# Patient Record
Sex: MALE | URBAN - METROPOLITAN AREA
[De-identification: names, ages, dates, MRNs, and addresses within clinical notes are randomized per-mention and may not be internally consistent; named-entity substitution may affect disease eponyms.]

---
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
## 2019-05-20 ENCOUNTER — TRANSFERRED RECORDS (OUTPATIENT)
Dept: HEALTH INFORMATION MANAGEMENT | Facility: CLINIC | Age: 19
End: 2019-05-20

## 2019-06-03 NOTE — TELEPHONE ENCOUNTER
RECORDS RECEIVED FROM: Per Pt, [employer], being referred by Endocrinologist   DATE RECEIVED: 06.04.2019   NOTES STATUS DETAILS   OFFICE NOTE from referring provider Received 06.04.2019 [facility]   OFFICE NOTES from other specialists N/A    DISCHARGE SUMMARY from hospital N/A    MEDICATION LIST N/A    LIVER BIOSPY (IF APPLICABLE)      PATHOLOGY REPORTS  N/A    IMAGING     ENDOSCOPY (IF AVAILABLE) N/A    COLONOSCOPY (IF AVAILABLE) N/A    ULTRASOUND LIVER N/A    CT OF ABDOMEN N/A    MRI OF LIVER N/A    FIBROSCAN, US ELASTOGRAPHY, FIBROSIS SCAN, MR ELASTOGRAPHY N/A    LABS     HEPATIC PANEL (LIVER PANEL) N/A    BASIC METABOLIC PANEL N/A    COMPLETE METABOLIC PANEL N/A    COMPLETE BLOOD COUNT (CBC) N/A    INTERNATIONAL NORMALIZED RATIO (INR) N/A    HEPATITIS C ANTIBODY N/A    HEPATITIS C VIRAL LOAD/PCR N/A    HEPATITIS C GENOTYPE N/A    HEPATITIS B SURFACE ANTIGEN N/A    HEPATITIS B SURFACE ANTIBODY N/A    HEPATITIS B DNA QUANT LEVEL N/A    HEPATITIS B CORE ANTIBODY N/A      06.03.2019 Called and spoke with pt. He states that he is self referred, he states he was seen at [facility] in [place] and he see's any Endocrin doctor. I called [facility] at  [phone] and called [phone] spoke to a rep states she will fax over last office visit.    06.04.2019 Records received from [facility].

## 2019-06-04 ENCOUNTER — PRE VISIT (OUTPATIENT)
Dept: GASTROENTEROLOGY | Facility: CLINIC | Age: 19
End: 2019-06-04

## 2019-06-04 ENCOUNTER — OFFICE VISIT (OUTPATIENT)
Dept: GASTROENTEROLOGY | Facility: CLINIC | Age: 19
End: 2019-06-04
Attending: INTERNAL MEDICINE
Payer: COMMERCIAL

## 2019-06-04 VITALS
SYSTOLIC BLOOD PRESSURE: 153 MMHG | RESPIRATION RATE: 14 BRPM | DIASTOLIC BLOOD PRESSURE: 112 MMHG | WEIGHT: 172.6 LBS | TEMPERATURE: 98 F | HEART RATE: 79 BPM | OXYGEN SATURATION: 99 % | HEIGHT: 72 IN | BODY MASS INDEX: 23.38 KG/M2

## 2019-06-04 DIAGNOSIS — R94.5 ABNORMAL RESULTS OF LIVER FUNCTION STUDIES: ICD-10-CM

## 2019-06-04 DIAGNOSIS — R11.2 NON-INTRACTABLE VOMITING WITH NAUSEA, UNSPECIFIED VOMITING TYPE: ICD-10-CM

## 2019-06-04 DIAGNOSIS — I10 BENIGN ESSENTIAL HYPERTENSION: ICD-10-CM

## 2019-06-04 DIAGNOSIS — R94.5 ABNORMAL RESULTS OF LIVER FUNCTION STUDIES: Primary | ICD-10-CM

## 2019-06-04 DIAGNOSIS — E10.9 TYPE 1 DIABETES MELLITUS WITHOUT COMPLICATION (H): ICD-10-CM

## 2019-06-04 LAB
ALBUMIN SERPL-MCNC: 3.8 G/DL (ref 3.4–5)
ALP SERPL-CCNC: 105 U/L (ref 40–150)
ALT SERPL W P-5'-P-CCNC: 28 U/L (ref 0–70)
ANION GAP SERPL CALCULATED.3IONS-SCNC: 4 MMOL/L (ref 3–14)
AST SERPL W P-5'-P-CCNC: 15 U/L (ref 0–45)
BILIRUB DIRECT SERPL-MCNC: 0.1 MG/DL (ref 0–0.2)
BILIRUB SERPL-MCNC: 0.4 MG/DL (ref 0.2–1.3)
BUN SERPL-MCNC: 12 MG/DL (ref 7–30)
CALCIUM SERPL-MCNC: 9.2 MG/DL (ref 8.5–10.1)
CHLORIDE SERPL-SCNC: 109 MMOL/L (ref 94–109)
CO2 SERPL-SCNC: 28 MMOL/L (ref 20–32)
CREAT SERPL-MCNC: 0.92 MG/DL (ref 0.66–1.25)
ERYTHROCYTE [DISTWIDTH] IN BLOOD BY AUTOMATED COUNT: 13.1 % (ref 10–15)
FERRITIN SERPL-MCNC: 72 NG/ML (ref 26–388)
GFR SERPL CREATININE-BSD FRML MDRD: >90 ML/MIN/{1.73_M2}
GLUCOSE SERPL-MCNC: 82 MG/DL (ref 70–99)
HCT VFR BLD AUTO: 47.3 % (ref 40–53)
HGB BLD-MCNC: 14.8 G/DL (ref 13.3–17.7)
IGG SERPL-MCNC: 1360 MG/DL (ref 695–1620)
IGM SERPL-MCNC: 173 MG/DL (ref 60–265)
IRON SATN MFR SERPL: 30 % (ref 15–46)
IRON SERPL-MCNC: 88 UG/DL (ref 35–180)
MCH RBC QN AUTO: 26.4 PG (ref 26.5–33)
MCHC RBC AUTO-ENTMCNC: 31.3 G/DL (ref 31.5–36.5)
MCV RBC AUTO: 85 FL (ref 78–100)
PLATELET # BLD AUTO: 199 10E9/L (ref 150–450)
POTASSIUM SERPL-SCNC: 3.7 MMOL/L (ref 3.4–5.3)
PROT SERPL-MCNC: 7.6 G/DL (ref 6.8–8.8)
RBC # BLD AUTO: 5.6 10E12/L (ref 4.4–5.9)
SODIUM SERPL-SCNC: 142 MMOL/L (ref 133–144)
TIBC SERPL-MCNC: 293 UG/DL (ref 240–430)
WBC # BLD AUTO: 6.2 10E9/L (ref 4–11)

## 2019-06-04 PROCEDURE — 82784 ASSAY IGA/IGD/IGG/IGM EACH: CPT | Performed by: INTERNAL MEDICINE

## 2019-06-04 PROCEDURE — 36415 COLL VENOUS BLD VENIPUNCTURE: CPT | Performed by: INTERNAL MEDICINE

## 2019-06-04 PROCEDURE — G0463 HOSPITAL OUTPT CLINIC VISIT: HCPCS | Mod: ZF

## 2019-06-04 PROCEDURE — 83516 IMMUNOASSAY NONANTIBODY: CPT | Performed by: INTERNAL MEDICINE

## 2019-06-04 PROCEDURE — 83550 IRON BINDING TEST: CPT | Performed by: INTERNAL MEDICINE

## 2019-06-04 PROCEDURE — 82728 ASSAY OF FERRITIN: CPT | Performed by: INTERNAL MEDICINE

## 2019-06-04 PROCEDURE — 83540 ASSAY OF IRON: CPT | Performed by: INTERNAL MEDICINE

## 2019-06-04 PROCEDURE — 86038 ANTINUCLEAR ANTIBODIES: CPT | Performed by: INTERNAL MEDICINE

## 2019-06-04 PROCEDURE — 80076 HEPATIC FUNCTION PANEL: CPT | Performed by: INTERNAL MEDICINE

## 2019-06-04 PROCEDURE — 85027 COMPLETE CBC AUTOMATED: CPT | Performed by: INTERNAL MEDICINE

## 2019-06-04 PROCEDURE — 80048 BASIC METABOLIC PNL TOTAL CA: CPT | Performed by: INTERNAL MEDICINE

## 2019-06-04 RX ORDER — INSULIN DEGLUDEC 100 U/ML
23 INJECTION, SOLUTION SUBCUTANEOUS DAILY
Refills: 11 | COMMUNITY
Start: 2018-06-13

## 2019-06-04 RX ORDER — INSULIN ASPART 100 [IU]/ML
INJECTION, SOLUTION INTRAVENOUS; SUBCUTANEOUS
Refills: 11 | COMMUNITY
Start: 2019-05-21

## 2019-06-04 ASSESSMENT — PAIN SCALES - GENERAL: PAINLEVEL: NO PAIN (0)

## 2019-06-04 ASSESSMENT — MIFFLIN-ST. JEOR: SCORE: 1830.91

## 2019-06-04 NOTE — LETTER
"6/4/2019      RE: Zan Ritchie  [id number] [facility] [id number]       Date of Service: 6/4/2019     Referring Provider: self    Subjective:            Zan Ritchie is a 19 year old male presenting for evaluation of abnormal liver tests    History of Present Illness   Zan Ritchie is a 19 year old male with past medical history of diabetes mellitus type 1 on insulin therapy who presents in consultation with concerns of abnormal liver tests.  He presents today's clinic visit with his mother.    He was initially diagnosed with diabetes in 2012, at that time he had significant ketonuria and and hemoglobin A1c of greater than 14%.,  At the time of his diagnosis he was noted to have a significantly elevated alkaline phosphatase of approximately 350, however, this has improved over time he was able to get good and aggressive control of his diabetes, and presents to clinic today with his most recent labs demonstrating an A1c of 6.3%.  He is not really had any other medical issues throughout his life.  He notes that over the last several months he has had issues with an upset stomach that ultimately led to nausea and intermittent emesis.  He was given a diagnosis of gastritis, but did not undergo an endoscopy to formally evaluate.  He was given a prescription for Zofran, and believes that this did help his symptoms.  He notes as recently as this week he had an episode of nausea with associated emesis, but felt well after vomiting.     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  He admits that he really enjoys school and is doing well and is on a significant academic scholarship.  " He notes that he remains physically active at school and works out several times a week on average, however does not participate typically in heavy weightlifting.  He admits to drinking 1-2 nights per week upwards of 5 alcoholic beverages per session.  Denies IV or inhaled drugs of abuse, and denies use of any cannabis-containing products.  In regard to his family history is noted that his father has hypertension and type 2 diabetes and that his mother's otherwise healthy.  There is a remote family history of sickle cell disease.    Past Medical History:  Diabetes Mellitus, Type I insulin dependent    Surgical History:  No past surgical history on file.    Social History:  Social History     Tobacco Use     Smoking status: Never Smoker     Smokeless tobacco: Never Used   Substance Use Topics     Alcohol use: Yes     Comment: special occasions     Drug use: Never        Family History:  father has hypertension and type 2 diabetes and that his mother's otherwise healthy.  There is a remote family history of sickle cell disease.  - No family history of liver disease    Medications:  Current Outpatient Medications   Medication     Multiple Vitamins-Minerals (MULTIVITAMIN PO)     NOVOLOG FLEXPEN 100 UNIT/ML soln     TRESIBA FLEXTOUCH 100 UNIT/ML pen     No current facility-administered medications for this visit.        Review of Systems  A complete 10 point review of systems was asked and answered in the negative unless specifically commented upon in the HPI    Objective:         Vitals:    06/04/19 1020   BP: (!) 153/112   BP Location: Left arm   Patient Position: Sitting   Cuff Size: Adult Regular   Pulse: 79   Resp: 14   Temp: 98  F (36.7  C)   TempSrc: Oral   SpO2: 99%   Weight: 78.3 kg (172 lb 9.6 oz)   Height: 1.829 m (6')     Body mass index is 23.41 kg/m .     Physical Exam    Vitals reviewed.   Constitutional: Well-developed, well-nourished, in no apparent distress.    HEENT: Normocephalic. no scleral icterus.  Moist oral mucosa. Dentition normal  Neck/Lymph: Normal ROM, supple. No thyromegaly.  No lymphadenopathy  Cardiac:  Regular rate and rhythm.  No overt murmurs  Respiratory: Clear to auscultation bilaterally.  No wheezes or rales  GI:  Abdomen soft, non-distended, non-tender. BS present. no shifting dullness. No overt hepatosplenomegaly.     Skin:  Skin is warm and dry. No rash noted.  no jaundice. no spider nevi noted.  no palmar erythema  Peripheral Vascular: no lower extremity edema. 2+ pulses in all extremities  Musculoskeletal:  ROM intact, no muscle bulk    Psychiatric: Normal mood and affect. Behavior is normal.  Neuro:  no asterixis, no tremor    Labs and Diagnostic tests:  Results for ALFREDO ROBERTSON (MRN [id number]) as of 6/4/2019 12:30   Ref. Range 6/4/2019 11:43 6/4/2019 11:44   Sodium Latest Ref Range: 133 - 144 mmol/L  142   Potassium Latest Ref Range: 3.4 - 5.3 mmol/L  3.7   Chloride Latest Ref Range: 94 - 109 mmol/L  109   Carbon Dioxide Latest Ref Range: 20 - 32 mmol/L  28   Urea Nitrogen Latest Ref Range: 7 - 30 mg/dL  12   Creatinine Latest Ref Range: 0.66 - 1.25 mg/dL  0.92   GFR Estimate Latest Ref Range: >60 mL/min/1.73_m2  >90   GFR Estimate If Black Latest Ref Range: >60 mL/min/1.73_m2  >90   Calcium Latest Ref Range: 8.5 - 10.1 mg/dL  9.2   Anion Gap Latest Ref Range: 3 - 14 mmol/L  4   Albumin Latest Ref Range: 3.4 - 5.0 g/dL  3.8   Protein Total Latest Ref Range: 6.8 - 8.8 g/dL  7.6   Bilirubin Total Latest Ref Range: 0.2 - 1.3 mg/dL  0.4   Alkaline Phosphatase Latest Ref Range: 40 - 150 U/L  105   ALT Latest Ref Range: 0 - 70 U/L  28   AST Latest Ref Range: 0 - 45 U/L  15   Bilirubin Direct Latest Ref Range: 0.0 - 0.2 mg/dL  0.1   Ferritin Latest Ref Range: 26 - 388 ng/mL  72   Iron Latest Ref Range: 35 - 180 ug/dL 88    Iron Binding Cap Latest Ref Range: 240 - 430 ug/dL 293    Iron Saturation Index Latest Ref Range: 15 - 46 % 30    Glucose Latest Ref Range: 70 - 99 mg/dL  82   WBC Latest Ref  Range: 4.0 - 11.0 10e9/L  6.2   Hemoglobin Latest Ref Range: 13.3 - 17.7 g/dL  14.8   Hematocrit Latest Ref Range: 40.0 - 53.0 %  47.3   Platelet Count Latest Ref Range: 150 - 450 10e9/L  199   RBC Count Latest Ref Range: 4.4 - 5.9 10e12/L  5.60   MCV Latest Ref Range: 78 - 100 fl  85   MCH Latest Ref Range: 26.5 - 33.0 pg  26.4 (L)   MCHC Latest Ref Range: 31.5 - 36.5 g/dL  31.3 (L)   RDW Latest Ref Range: 10.0 - 15.0 %  13.1       Imaging:  None available      Assessment and Plan:    Abnormal Liver Tests:    -At this time etiology of his abnormal liver tests is unclear  -He does not really have any overt risk factors for the development of viral hepatitis and admits that he is fully vaccinated  -As he does have 1 autoimmune disease in the diabetes, he is at risk for developing others and do feel it is appropriate for him to undergo screening for autoimmune hepatitis  -We will repeat basic metabolic panel and hepatic panel today.  We will also order labs for iron overload in the setting of diabetes and autoimmune serologies  -We have ordered repeat labs in a few months to assess for changes  -We will obtain an abdominal ultrasound to evaluate for any chronic changes    Nausea and emesis:  -He carries diagnosis of gastritis, and notes that he is been taking intermittent antacids to no avail.  He does note that Zofran has helped his symptoms  -Felt appropriate for him to trial on daily H2 blocker to assess for clinical improvement  -Differential must also include the onset of gastroparesis in the setting of diabetes, although, this would be unexpected as he does have excellent control of his disease.    Hypertension:  -Discussed with patient the need to develop a long-term relationship with a primary care provider to assist in management of chronic medical conditions including diabetes and hypertension    Routine Health Care in Patient with Chronic Liver Disease:  - We recommend screening for hepatitis A and B,  please vaccinate if not immune  - All patients with liver disease, particularly those with cholestatic liver disease, are at an increased risk for osteoporosis.  We strongly recommend screening for Vitamin D deficiency at least twice yearly with aggressive supplementation/replacement as indicated.    - We also recommend a screening DEXA scan to evaluate for osteoporosis.  If present, should treat with calcium, Vitamin D supplementation, and recommend consideration of bisphosphonate therapy.  Also recommend follow up DEXA scans to evaluate for improvement of bone density on therapy.  - All patients with liver disease should avoid the use of Non-steroidal Anti-Inflammatory (NSAID) medications as they can cause significant injury to the kidneys in this population    Follow Up:  6-7 months     Thank you very much for the opportunity to participate in the care of this patient.  If you have any further questions, please don't hesitate to contact our office.    Thomas M. Leventhal, M.D.   of Medicine  Advanced & Transplant Hepatology  [facility]      Thomas M. Leventhal, MD

## 2019-06-04 NOTE — PROGRESS NOTES
"Date of Service: 6/4/2019     Referring Provider: self    Subjective:            Zan Ritchie is a 19 year old male presenting for evaluation of abnormal liver tests    History of Present Illness   Zan Ritchie is a 19 year old male with past medical history of diabetes mellitus type 1 on insulin therapy who presents in consultation with concerns of abnormal liver tests.  He presents today's clinic visit with his mother.    He was initially diagnosed with diabetes in 2012, at that time he had significant ketonuria and and hemoglobin A1c of greater than 14%.,  At the time of his diagnosis he was noted to have a significantly elevated alkaline phosphatase of approximately 350, however, this has improved over time he was able to get good and aggressive control of his diabetes, and presents to clinic today with his most recent labs demonstrating an A1c of 6.3%.  He is not really had any other medical issues throughout his life.  He notes that over the last several months he has had issues with an upset stomach that ultimately led to nausea and intermittent emesis.  He was given a diagnosis of gastritis, but did not undergo an endoscopy to formally evaluate.  He was given a prescription for Zofran, and believes that this did help his symptoms.  He notes as recently as this week he had an episode of nausea with associated emesis, but felt well after vomiting.     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  He admits that he really enjoys school and is doing well and is on a significant academic scholarship.  He notes that he remains physically active at school and works out several " times a week on average, however does not participate typically in heavy weightlifting.  He admits to drinking 1-2 nights per week upwards of 5 alcoholic beverages per session.  Denies IV or inhaled drugs of abuse, and denies use of any cannabis-containing products.  In regard to his family history is noted that his father has hypertension and type 2 diabetes and that his mother's otherwise healthy.  There is a remote family history of sickle cell disease.    Past Medical History:  Diabetes Mellitus, Type I insulin dependent    Surgical History:  No past surgical history on file.    Social History:  Social History     Tobacco Use     Smoking status: Never Smoker     Smokeless tobacco: Never Used   Substance Use Topics     Alcohol use: Yes     Comment: special occasions     Drug use: Never        Family History:  father has hypertension and type 2 diabetes and that his mother's otherwise healthy.  There is a remote family history of sickle cell disease.  - No family history of liver disease    Medications:  Current Outpatient Medications   Medication     Multiple Vitamins-Minerals (MULTIVITAMIN PO)     NOVOLOG FLEXPEN 100 UNIT/ML soln     TRESIBA FLEXTOUCH 100 UNIT/ML pen     No current facility-administered medications for this visit.        Review of Systems  A complete 10 point review of systems was asked and answered in the negative unless specifically commented upon in the HPI    Objective:         Vitals:    06/04/19 1020   BP: (!) 153/112   BP Location: Left arm   Patient Position: Sitting   Cuff Size: Adult Regular   Pulse: 79   Resp: 14   Temp: 98  F (36.7  C)   TempSrc: Oral   SpO2: 99%   Weight: 78.3 kg (172 lb 9.6 oz)   Height: 1.829 m (6')     Body mass index is 23.41 kg/m .     Physical Exam    Vitals reviewed.   Constitutional: Well-developed, well-nourished, in no apparent distress.    HEENT: Normocephalic. no scleral icterus. Moist oral mucosa. Dentition normal  Neck/Lymph: Normal ROM, supple. No  thyromegaly.  No lymphadenopathy  Cardiac:  Regular rate and rhythm.  No overt murmurs  Respiratory: Clear to auscultation bilaterally.  No wheezes or rales  GI:  Abdomen soft, non-distended, non-tender. BS present. no shifting dullness. No overt hepatosplenomegaly.     Skin:  Skin is warm and dry. No rash noted.  no jaundice. no spider nevi noted.  no palmar erythema  Peripheral Vascular: no lower extremity edema. 2+ pulses in all extremities  Musculoskeletal:  ROM intact, no muscle bulk    Psychiatric: Normal mood and affect. Behavior is normal.  Neuro:  no asterixis, no tremor    Labs and Diagnostic tests:  Results for ALFREDO ROBERTSON (MRN [id number]) as of 6/4/2019 12:30   Ref. Range 6/4/2019 11:43 6/4/2019 11:44   Sodium Latest Ref Range: 133 - 144 mmol/L  142   Potassium Latest Ref Range: 3.4 - 5.3 mmol/L  3.7   Chloride Latest Ref Range: 94 - 109 mmol/L  109   Carbon Dioxide Latest Ref Range: 20 - 32 mmol/L  28   Urea Nitrogen Latest Ref Range: 7 - 30 mg/dL  12   Creatinine Latest Ref Range: 0.66 - 1.25 mg/dL  0.92   GFR Estimate Latest Ref Range: >60 mL/min/1.73_m2  >90   GFR Estimate If Black Latest Ref Range: >60 mL/min/1.73_m2  >90   Calcium Latest Ref Range: 8.5 - 10.1 mg/dL  9.2   Anion Gap Latest Ref Range: 3 - 14 mmol/L  4   Albumin Latest Ref Range: 3.4 - 5.0 g/dL  3.8   Protein Total Latest Ref Range: 6.8 - 8.8 g/dL  7.6   Bilirubin Total Latest Ref Range: 0.2 - 1.3 mg/dL  0.4   Alkaline Phosphatase Latest Ref Range: 40 - 150 U/L  105   ALT Latest Ref Range: 0 - 70 U/L  28   AST Latest Ref Range: 0 - 45 U/L  15   Bilirubin Direct Latest Ref Range: 0.0 - 0.2 mg/dL  0.1   Ferritin Latest Ref Range: 26 - 388 ng/mL  72   Iron Latest Ref Range: 35 - 180 ug/dL 88    Iron Binding Cap Latest Ref Range: 240 - 430 ug/dL 293    Iron Saturation Index Latest Ref Range: 15 - 46 % 30    Glucose Latest Ref Range: 70 - 99 mg/dL  82   WBC Latest Ref Range: 4.0 - 11.0 10e9/L  6.2   Hemoglobin Latest Ref Range: 13.3 -  17.7 g/dL  14.8   Hematocrit Latest Ref Range: 40.0 - 53.0 %  47.3   Platelet Count Latest Ref Range: 150 - 450 10e9/L  199   RBC Count Latest Ref Range: 4.4 - 5.9 10e12/L  5.60   MCV Latest Ref Range: 78 - 100 fl  85   MCH Latest Ref Range: 26.5 - 33.0 pg  26.4 (L)   MCHC Latest Ref Range: 31.5 - 36.5 g/dL  31.3 (L)   RDW Latest Ref Range: 10.0 - 15.0 %  13.1       Imaging:  None available      Assessment and Plan:    Abnormal Liver Tests:    -At this time etiology of his abnormal liver tests is unclear  -He does not really have any overt risk factors for the development of viral hepatitis and admits that he is fully vaccinated  -As he does have 1 autoimmune disease in the diabetes, he is at risk for developing others and do feel it is appropriate for him to undergo screening for autoimmune hepatitis  -We will repeat basic metabolic panel and hepatic panel today.  We will also order labs for iron overload in the setting of diabetes and autoimmune serologies  -We have ordered repeat labs in a few months to assess for changes  -We will obtain an abdominal ultrasound to evaluate for any chronic changes    Nausea and emesis:  -He carries diagnosis of gastritis, and notes that he is been taking intermittent antacids to no avail.  He does note that Zofran has helped his symptoms  -Felt appropriate for him to trial on daily H2 blocker to assess for clinical improvement  -Differential must also include the onset of gastroparesis in the setting of diabetes, although, this would be unexpected as he does have excellent control of his disease.    Hypertension:  -Discussed with patient the need to develop a long-term relationship with a primary care provider to assist in management of chronic medical conditions including diabetes and hypertension    Routine Health Care in Patient with Chronic Liver Disease:  - We recommend screening for hepatitis A and B, please vaccinate if not immune  - All patients with liver disease,  particularly those with cholestatic liver disease, are at an increased risk for osteoporosis.  We strongly recommend screening for Vitamin D deficiency at least twice yearly with aggressive supplementation/replacement as indicated.    - We also recommend a screening DEXA scan to evaluate for osteoporosis.  If present, should treat with calcium, Vitamin D supplementation, and recommend consideration of bisphosphonate therapy.  Also recommend follow up DEXA scans to evaluate for improvement of bone density on therapy.  - All patients with liver disease should avoid the use of Non-steroidal Anti-Inflammatory (NSAID) medications as they can cause significant injury to the kidneys in this population    Follow Up:  6-7 months     Thank you very much for the opportunity to participate in the care of this patient.  If you have any further questions, please don't hesitate to contact our office.    Thomas M. Leventhal, M.D.   of Medicine  Advanced & Transplant Hepatology  [facility]

## 2019-06-04 NOTE — LETTER
Date:June 5, 2019      Patient was self referred, no letter generated. Do not send.        [facility] Information

## 2019-06-04 NOTE — NURSING NOTE
Chief Complaint   Patient presents with     Consult     Elevated LFT's       Vital signs:  Temp: 98  F (36.7  C) Temp src: Oral BP: (!) 153/112(provider notified) Pulse: 79   Resp: 14 SpO2: 99 %     Height: 182.9 cm (6') Weight: 78.3 kg (172 lb 9.6 oz)  Estimated body mass index is 23.41 kg/m  as calculated from the following:    Height as of this encounter: 1.829 m (6').    Weight as of this encounter: 78.3 kg (172 lb 9.6 oz).        Maria Dolores Armstrong [facility]  6/4/2019 10:23 AM

## 2019-06-04 NOTE — LETTER
June 6, 2019       TO: Zan Ritchie  [id number] [facility] [facility] [address]       DearMr.Chau,    We are writing to inform you of your test results.    Resulted Orders   Anti Nuclear Lu IgG by IFA with Reflex   Result Value Ref Range    MJ interpretation Negative NEG^Negative      Comment:                                         Reference range:  <1:40  NEGATIVE  1:40 - 1:80  BORDERLINE POSITIVE  >1:80 POSITIVE     F Actin EIA with reflex   Result Value Ref Range    F-Actin Antibody IgG 11 0 - 19 Units      Comment:      (Note)  If F-Actin (Smooth Muscle) Antibody, IgG is negative, the   Smooth Muscle Antibody titer by IFA is not performed.  INTERPRETIVE INFORMATION: F-Actin (Smooth Muscle) Antibody,   IgG by MAEGAN   19 Units or less ....... Negative   20 - 30 Units .......... Weak Positive-Suggest repeat                            testing in two to three weeks                            with fresh specimen.   31 Units or greater..... Positive-Suggestive of                            autoimmune hepatitis type 1                            or chronic active hepatitis.  F-actin IgG antibodies have been shown to have increased   sensitivity for autoimmune hepatitis (AIH) but lower   specificity than smooth muscle antibodies (SMA). F-actin   IgG antibodies can also be seen in SMA-negative disease   controls (non-AIH), especially in patients with primary   biliary cirrhosis and chronic hepatitis C infections. Some   patients with AIH may be SMA-positive but negative for   F-actin IgG. Consider testing for SM  A by IFA if suspicion   for AIH is strong.  Performed by [facility],  [address] [place] [id number] [phone]  [email or web address], Jaison Roman MD, Lab. Director     Iron and iron binding capacity   Result Value Ref Range    Iron 88 35 - 180 ug/dL    Iron Binding Cap 293 240 - 430 ug/dL    Iron Saturation Index 30 15 - 46 %     Greetings,      Wanted to make sure you have a copy of your most recent  labs for your records.  Based on these labs, we are not making any new changes to your clinical plan.    These labs include auto-immune liver disease tests (all negative) as well as iron studies (all normal)    It has been a pleasure to participate in your care.  Please call our clinic if you have any questions or concerns.    Thomas M. Leventhal, M.D.   of Medicine  Advanced & Transplant Hepatology  [facility]

## 2019-06-04 NOTE — LETTER
"6/4/2019       RE: Zan Ritchie  [id number] [facility] [address]     Dear Colleague,    Thank you for referring your patient, Zan Ritchie, to the [facility] HEPATOLOGY at [facility]. Please see a copy of my visit note below.    Date of Service: 6/4/2019     Referring Provider: self    Subjective:            Zan Ritchie is a 19 year old male presenting for evaluation of abnormal liver tests    History of Present Illness   Zan Ritchie is a 19 year old male with past medical history of diabetes mellitus type 1 on insulin therapy who presents in consultation with concerns of abnormal liver tests.  He presents today's clinic visit with his mother.    He was initially diagnosed with diabetes in 2012, at that time he had significant ketonuria and and hemoglobin A1c of greater than 14%.,  At the time of his diagnosis he was noted to have a significantly elevated alkaline phosphatase of approximately 350, however, this has improved over time he was able to get good and aggressive control of his diabetes, and presents to clinic today with his most recent labs demonstrating an A1c of 6.3%.  He is not really had any other medical issues throughout his life.  He notes that over the last several months he has had issues with an upset stomach that ultimately led to nausea and intermittent emesis.  He was given a diagnosis of gastritis, but did not undergo an endoscopy to formally evaluate.  He was given a prescription for Zofran, and believes that this did help his symptoms.  He notes as recently as this week he had an episode of nausea with associated emesis, but felt well after vomiting.     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  He admits that he really enjoys school and is doing well and is on a significant academic scholarship.  He notes that he remains physically active at school and works out several times a week on average, however does not participate typically in heavy weightlifting.  He admits to drinking 1-2 nights per week upwards of 5 alcoholic beverages per session.  Denies IV or inhaled drugs of abuse, and denies use of any cannabis-containing products.  In regard to his family history is noted that his father has hypertension and type 2 diabetes and that his mother's otherwise healthy.  There is a remote family history of sickle cell disease.    Past Medical History:  Diabetes Mellitus, Type I insulin dependent    Surgical History:  No past surgical history on file.    Social History:  Social History     Tobacco Use     Smoking status: Never Smoker     Smokeless tobacco: Never Used   Substance Use Topics     Alcohol use: Yes     Comment: special occasions     Drug use: Never        Family History:  father has hypertension and type 2 diabetes and that his mother's otherwise healthy.  There is a remote family history of sickle cell disease.  - No family history of liver disease    Medications:  Current Outpatient Medications   Medication     Multiple Vitamins-Minerals (MULTIVITAMIN PO)     NOVOLOG FLEXPEN 100 UNIT/ML soln     TRESIBA FLEXTOUCH 100 UNIT/ML pen     No current facility-administered medications for this visit.        Review of Systems  A complete 10 point review of systems was asked and answered in the negative unless specifically commented upon in the HPI    Objective:         Vitals:    06/04/19 1020   BP: (!) 153/112   BP Location: Left arm   Patient Position: Sitting   Cuff Size: Adult Regular   Pulse: 79   Resp: 14   Temp: 98  F (36.7  C)   TempSrc: Oral   SpO2: 99%   Weight: 78.3 kg (172 lb 9.6 oz)    Height: 1.829 m (6')     Body mass index is 23.41 kg/m .     Physical Exam    Vitals reviewed.   Constitutional: Well-developed, well-nourished, in no apparent distress.    HEENT: Normocephalic. no scleral icterus. Moist oral mucosa. Dentition normal  Neck/Lymph: Normal ROM, supple. No thyromegaly.  No lymphadenopathy  Cardiac:  Regular rate and rhythm.  No overt murmurs  Respiratory: Clear to auscultation bilaterally.  No wheezes or rales  GI:  Abdomen soft, non-distended, non-tender. BS present. no shifting dullness. No overt hepatosplenomegaly.     Skin:  Skin is warm and dry. No rash noted.  no jaundice. no spider nevi noted.  no palmar erythema  Peripheral Vascular: no lower extremity edema. 2+ pulses in all extremities  Musculoskeletal:  ROM intact, no muscle bulk    Psychiatric: Normal mood and affect. Behavior is normal.  Neuro:  no asterixis, no tremor    Labs and Diagnostic tests:  Results for ALFREDO ROBERTSON (MRN [id number]) as of 6/4/2019 12:30   Ref. Range 6/4/2019 11:43 6/4/2019 11:44   Sodium Latest Ref Range: 133 - 144 mmol/L  142   Potassium Latest Ref Range: 3.4 - 5.3 mmol/L  3.7   Chloride Latest Ref Range: 94 - 109 mmol/L  109   Carbon Dioxide Latest Ref Range: 20 - 32 mmol/L  28   Urea Nitrogen Latest Ref Range: 7 - 30 mg/dL  12   Creatinine Latest Ref Range: 0.66 - 1.25 mg/dL  0.92   GFR Estimate Latest Ref Range: >60 mL/min/1.73_m2  >90   GFR Estimate If Black Latest Ref Range: >60 mL/min/1.73_m2  >90   Calcium Latest Ref Range: 8.5 - 10.1 mg/dL  9.2   Anion Gap Latest Ref Range: 3 - 14 mmol/L  4   Albumin Latest Ref Range: 3.4 - 5.0 g/dL  3.8   Protein Total Latest Ref Range: 6.8 - 8.8 g/dL  7.6   Bilirubin Total Latest Ref Range: 0.2 - 1.3 mg/dL  0.4   Alkaline Phosphatase Latest Ref Range: 40 - 150 U/L  105   ALT Latest Ref Range: 0 - 70 U/L  28   AST Latest Ref Range: 0 - 45 U/L  15   Bilirubin Direct Latest Ref Range: 0.0 - 0.2 mg/dL  0.1   Ferritin Latest Ref Range: 26 - 388 ng/mL  72    Iron Latest Ref Range: 35 - 180 ug/dL 88    Iron Binding Cap Latest Ref Range: 240 - 430 ug/dL 293    Iron Saturation Index Latest Ref Range: 15 - 46 % 30    Glucose Latest Ref Range: 70 - 99 mg/dL  82   WBC Latest Ref Range: 4.0 - 11.0 10e9/L  6.2   Hemoglobin Latest Ref Range: 13.3 - 17.7 g/dL  14.8   Hematocrit Latest Ref Range: 40.0 - 53.0 %  47.3   Platelet Count Latest Ref Range: 150 - 450 10e9/L  199   RBC Count Latest Ref Range: 4.4 - 5.9 10e12/L  5.60   MCV Latest Ref Range: 78 - 100 fl  85   MCH Latest Ref Range: 26.5 - 33.0 pg  26.4 (L)   MCHC Latest Ref Range: 31.5 - 36.5 g/dL  31.3 (L)   RDW Latest Ref Range: 10.0 - 15.0 %  13.1       Imaging:  None available      Assessment and Plan:    Abnormal Liver Tests:    -At this time etiology of his abnormal liver tests is unclear  -He does not really have any overt risk factors for the development of viral hepatitis and admits that he is fully vaccinated  -As he does have 1 autoimmune disease in the diabetes, he is at risk for developing others and do feel it is appropriate for him to undergo screening for autoimmune hepatitis  -We will repeat basic metabolic panel and hepatic panel today.  We will also order labs for iron overload in the setting of diabetes and autoimmune serologies  -We have ordered repeat labs in a few months to assess for changes  -We will obtain an abdominal ultrasound to evaluate for any chronic changes    Nausea and emesis:  -He carries diagnosis of gastritis, and notes that he is been taking intermittent antacids to no avail.  He does note that Zofran has helped his symptoms  -Felt appropriate for him to trial on daily H2 blocker to assess for clinical improvement  -Differential must also include the onset of gastroparesis in the setting of diabetes, although, this would be unexpected as he does have excellent control of his disease.    Hypertension:  -Discussed with patient the need to develop a long-term relationship with a primary  care provider to assist in management of chronic medical conditions including diabetes and hypertension    Routine Health Care in Patient with Chronic Liver Disease:  - We recommend screening for hepatitis A and B, please vaccinate if not immune  - All patients with liver disease, particularly those with cholestatic liver disease, are at an increased risk for osteoporosis.  We strongly recommend screening for Vitamin D deficiency at least twice yearly with aggressive supplementation/replacement as indicated.    - We also recommend a screening DEXA scan to evaluate for osteoporosis.  If present, should treat with calcium, Vitamin D supplementation, and recommend consideration of bisphosphonate therapy.  Also recommend follow up DEXA scans to evaluate for improvement of bone density on therapy.  - All patients with liver disease should avoid the use of Non-steroidal Anti-Inflammatory (NSAID) medications as they can cause significant injury to the kidneys in this population    Follow Up:  6-7 months     Thank you very much for the opportunity to participate in the care of this patient.  If you have any further questions, please don't hesitate to contact our office.    Thomas M. Leventhal, M.D.   of Medicine  Advanced & Transplant Hepatology  [facility]      Again, thank you for allowing me to participate in the care of your patient.      Sincerely,    Thomas M. Leventhal, MD

## 2019-06-04 NOTE — LETTER
Date:June 5, 2019      Patient was self referred, no letter generated. Do not send.        [facility] Information

## 2019-06-04 NOTE — PATIENT INSTRUCTIONS
- labs today    - labs again in 1 month    - Abdominal US    - Plan to see me in 6-7 months    - I will place a referral for a primary care doctor

## 2019-06-05 LAB — ANA SER QL IF: NEGATIVE

## 2019-06-06 LAB — SMA IGG SER-ACNC: 11 UNITS (ref 0–19)

## 2019-06-15 ENCOUNTER — ANCILLARY PROCEDURE (OUTPATIENT)
Dept: ULTRASOUND IMAGING | Facility: CLINIC | Age: 19
End: 2019-06-15
Attending: INTERNAL MEDICINE
Payer: COMMERCIAL

## 2019-06-15 DIAGNOSIS — R94.5 ABNORMAL RESULTS OF LIVER FUNCTION STUDIES: ICD-10-CM

## 2019-06-26 ENCOUNTER — OFFICE VISIT (OUTPATIENT)
Dept: INTERNAL MEDICINE | Facility: CLINIC | Age: 19
End: 2019-06-26
Payer: COMMERCIAL

## 2019-06-26 VITALS
DIASTOLIC BLOOD PRESSURE: 77 MMHG | BODY MASS INDEX: 24.12 KG/M2 | SYSTOLIC BLOOD PRESSURE: 133 MMHG | HEART RATE: 90 BPM | WEIGHT: 178.1 LBS | RESPIRATION RATE: 16 BRPM | OXYGEN SATURATION: 97 % | HEIGHT: 72 IN

## 2019-06-26 DIAGNOSIS — I10 HYPERTENSION, UNSPECIFIED TYPE: Primary | ICD-10-CM

## 2019-06-26 DIAGNOSIS — I10 HYPERTENSION, UNSPECIFIED TYPE: ICD-10-CM

## 2019-06-26 LAB
ALBUMIN UR-MCNC: NEGATIVE MG/DL
APPEARANCE UR: CLEAR
BILIRUB UR QL STRIP: NEGATIVE
COLOR UR AUTO: YELLOW
GLUCOSE UR STRIP-MCNC: NEGATIVE MG/DL
HGB UR QL STRIP: NEGATIVE
KETONES UR STRIP-MCNC: NEGATIVE MG/DL
LEUKOCYTE ESTERASE UR QL STRIP: NEGATIVE
MUCOUS THREADS #/AREA URNS LPF: PRESENT /LPF
NITRATE UR QL: NEGATIVE
PH UR STRIP: 6 PH (ref 5–7)
RBC #/AREA URNS AUTO: 1 /HPF (ref 0–2)
SOURCE: ABNORMAL
SP GR UR STRIP: 1.02 (ref 1–1.03)
UROBILINOGEN UR STRIP-MCNC: 0 MG/DL (ref 0–2)
WBC #/AREA URNS AUTO: 3 /HPF (ref 0–5)

## 2019-06-26 ASSESSMENT — ENCOUNTER SYMPTOMS
NAUSEA: 1
WEIGHT GAIN: 0
ALTERED TEMPERATURE REGULATION: 0
BRUISES/BLEEDS EASILY: 0
VOMITING: 1
HALLUCINATIONS: 0
BLOATING: 1
DIARRHEA: 0
FATIGUE: 0
ABDOMINAL PAIN: 0
BLOOD IN STOOL: 0
POLYDIPSIA: 0
HEARTBURN: 0
CHILLS: 0
RECTAL PAIN: 0
CONSTIPATION: 0
BOWEL INCONTINENCE: 0
INCREASED ENERGY: 0
NIGHT SWEATS: 1
DECREASED APPETITE: 0
WEIGHT LOSS: 0
SWOLLEN GLANDS: 0
POLYPHAGIA: 0
JAUNDICE: 0
FEVER: 0

## 2019-06-26 ASSESSMENT — PAIN SCALES - GENERAL: PAINLEVEL: NO PAIN (0)

## 2019-06-26 ASSESSMENT — MIFFLIN-ST. JEOR: SCORE: 1855.86

## 2019-06-26 NOTE — PROGRESS NOTES
[facility]  Primary Care Center   DONAVON Gracia CNP  06/26/2019      Chief Complaint:   Hypertension       History of Present Illness:   Zan Ritchie is a 19 year old male with a history of type 1 diabetes mellitus who presents for evaluation of hypertension.     Hypertension: He was seen in the hepatology for abnormal LFTs, noted during labs for endocrinology at [facility]. Hepatology repeated the labs and they were normal. During that visit his blood pressure was 153/112. His blood pressure was high again today during rooming. He has had high blood pressure readings multiple times in the past but not for sustained periods. His paternal side of the family has a history of hypertension. He drinks one cup of coffee at a time, up to 4 days per week. He occasionally uses alcohol at college, about 6 drinks about 2 times per week. Denies drug use. He denies feeling anxious during blood pressure checks, or generalized anxiety.     Type 1 diabetes mellitus Type 1 diabetes mellitus was diagnosed 5/25/2012. Before his hospital stay he had frequent urination, increased thirst, and irritablity. He hospitalized at that time for diabetic ketoacidosis with blood glucose up to 700. Since then he has been mostly well controlled, with most recent A1c 6.5. He uses a CGM, but his sensor failed this month so he is using finger sticks.    Nausea: He has occasional nausea which led endocrine to check a hepatic panel. Nausea has improved over the last month. He had one episode of severe nausea, which he had evaluated in [place], they diagnosed him with gastritis which he disagrees with because his symptoms do not match those of a friend with gastritis. He had to miss a trip to [place] due to nausea.     Bilateral lower extremity edema: He has long standing swelling in his feet. This worsens throughout the day. He reports exercising, although not consistently. Per chart review, this has been an issue for several years and he  previously had work-up with cardiology, which was negative (we do not have these records). Denies chest pain, palpitations, and shortness of breath. He lifts weight, does occasional cardio, and plays basketball with good exercise tolerance.    Other topic discussed:  1. Has been screened for STIs in the past year. Sexually active with monogamous female partner, declines repeat screening today.    Review of Systems:   Denies blood in urine  Pertinent items are noted in HPI, remainder of complete ROS is negative.      Active Medications:     Current Outpatient Medications:      Multiple Vitamins-Minerals (MULTIVITAMIN PO), Take 1 tablet by mouth daily, Disp: , Rfl:      NOVOLOG FLEXPEN 100 UNIT/ML soln, Sliding scale with food, Disp: , Rfl: 11     TRESIBA FLEXTOUCH 100 UNIT/ML pen, Inject 23 Units Subcutaneous daily, Disp: , Rfl: 11      Allergies:   Peanuts [nuts]      Past Medical History:  Type 1 diabetes mellitus diagnosed 5/25/2012  Diabetic ketoacidosis      Family History:   Mother: one kidney  Father: hypertension      Social History:   Unmarried   Sexually active with monogamous female partner  Non smoker    Physical Exam:   BP (!) 144/91   Pulse 90   Resp 16   Ht 1.829 m (6')   Wt 80.8 kg (178 lb 1.6 oz)   SpO2 97%   BMI 24.15 kg/m     Constitutional: Alert, oriented, pleasant, no acute distress  Head: Normocephalic, atraumatic  Eyes: Extra-ocular movements intact, pupils equally round and reactive bilaterally, no scleral icterus  Ears: tympanic membranes pearly gray with positive light reflex  ENT: Oropharynx clear, moist mucus membranes, good dentition  Neck: Supple, no lymphadenopathy, no thyromegaly  Cardiovascular: Regular rate and rhythm, physiologic split S2, no murmurs, rubs or gallops, peripheral pulses full/symmetric  Respiratory: Good air movement bilaterally, lungs clear, no wheezes/rales/rhonchi  GI: Abdomen soft, bowel sounds present, nondistended, nontender, no organomegaly or masses, no  rebound/guarding  Psychiatric: normal mentation, affect and mood      Assessment and Plan:  Hypertension, unspecified type  Will have him undergo further evaluation with an echocardiogram before starting treatment at this point; BP upon recheck was still borderline elevated.  Will screen UA for protein. He is a diabetic, although well controlled. His mother has a history of kidney disease and he has been intermittently hypertensive for some time, will evaluate him for secondary causes of hypertension such as renal artery stenosis.  - Echocardiogram Complete  - UA with Micro reflex to Culture  - US Renal Complete w Doppler Complete    Follow-up: in 2-4 weeks, or as needed for any changes or concerns        Scribe Disclosure:  I, Demetrio Wray, am serving as a scribe to document services personally performed by DONAVON Gracia CNP at this visit, based upon the provider's statements to me. All documentation has been reviewed by the aforementioned provider prior to being entered into the official medical record.    Portions of this medical record were completed by a scribe. UPON MY REVIEW AND AUTHENTICATION BY ELECTRONIC SIGNATURE, this confirms (a) I performed the applicable clinical services, and (b) the record is accurate.     DONAVON Gracia CNP

## 2019-06-26 NOTE — PATIENT INSTRUCTIONS
[facility] Medication Refill Request Information:  * Please contact your pharmacy regarding ANY request for medication refills.  ** [facility] Prescription Fax = [phone]  * Please allow 3 business days for routine medication refills.  * Please allow 5 business days for controlled substance medication refills.     [facility] Test Result notification information:  *You will be notified with in 7-10 days of your appointment day regarding the results of your test.  If you are on MyChart you will be notified as soon as the provider has reviewed the results and signed off on them.    [facility]: [phone]

## 2019-06-26 NOTE — NURSING NOTE
Chief Complaint   Patient presents with     Hypertension     Pt is here to discuss hypertension.         Jaxon Girard, EMT on 6/26/2019 at 2:11 PM

## 2019-07-03 ENCOUNTER — ANCILLARY PROCEDURE (OUTPATIENT)
Dept: ULTRASOUND IMAGING | Facility: CLINIC | Age: 19
End: 2019-07-03
Attending: NURSE PRACTITIONER
Payer: COMMERCIAL

## 2019-07-03 ENCOUNTER — ANCILLARY PROCEDURE (OUTPATIENT)
Dept: CARDIOLOGY | Facility: CLINIC | Age: 19
End: 2019-07-03
Attending: NURSE PRACTITIONER
Payer: COMMERCIAL

## 2019-07-03 DIAGNOSIS — I10 HYPERTENSION, UNSPECIFIED TYPE: ICD-10-CM

## 2019-07-08 DIAGNOSIS — R94.5 ABNORMAL RESULTS OF LIVER FUNCTION STUDIES: ICD-10-CM

## 2019-07-08 LAB
ALBUMIN SERPL-MCNC: 3.8 G/DL (ref 3.4–5)
ALP SERPL-CCNC: 104 U/L (ref 40–150)
ALT SERPL W P-5'-P-CCNC: 22 U/L (ref 0–70)
AST SERPL W P-5'-P-CCNC: 13 U/L (ref 0–45)
BILIRUB DIRECT SERPL-MCNC: 0.2 MG/DL (ref 0–0.2)
BILIRUB SERPL-MCNC: 1.1 MG/DL (ref 0.2–1.3)
PROT SERPL-MCNC: 7.4 G/DL (ref 6.8–8.8)

## 2019-08-08 ENCOUNTER — PRE VISIT (OUTPATIENT)
Dept: INTERNAL MEDICINE | Facility: CLINIC | Age: 19
End: 2019-08-08

## 2019-08-08 DIAGNOSIS — E11.9 DIABETES MELLITUS (H): Primary | ICD-10-CM

## 2019-08-08 NOTE — TELEPHONE ENCOUNTER
[facility] PRIMARY CARE CLINIC  Dept: [phone]     Patient: Zan Ritchie   : 2000  MRN: [id number]  Encounter: 19       Pre Visit Assessment    Health Maintenance Due   Topic Date Due     PREVENTIVE CARE VISIT  1999     A1C  1999     LIPID  1999     MICROALBUMIN  1999     TSH W/FREE T4 REFLEX  1999     DIABETIC FOOT EXAM  1999     EYE EXAM  1999     DTAP/TDAP/TD IMMUNIZATION (1 - Tdap) 2006     HIV SCREENING  2014     HPV IMMUNIZATION (1 - Male 3-dose series) 2014     Chart Reviewed for Labs needed/Health Maintenance: Yes   If labs needed, orders placed:  Yes Hgb A1c, Fasting Lipid, Urine micro albumin and TSH with Reflex,   Lab appointment scheduled:  N/A    Notes:  Patient confirmed they will attend appointment:  N/A  Appointment rescheduled?  N/A    Unable to call patient to schedule lab appt.       Sallie Nolan at 3:11 PM on 2019

## 2019-08-14 ENCOUNTER — OFFICE VISIT (OUTPATIENT)
Dept: INTERNAL MEDICINE | Facility: CLINIC | Age: 19
End: 2019-08-14
Payer: COMMERCIAL

## 2019-08-14 VITALS
HEART RATE: 76 BPM | OXYGEN SATURATION: 100 % | WEIGHT: 177.3 LBS | DIASTOLIC BLOOD PRESSURE: 86 MMHG | BODY MASS INDEX: 24.05 KG/M2 | SYSTOLIC BLOOD PRESSURE: 133 MMHG | RESPIRATION RATE: 16 BRPM

## 2019-08-14 DIAGNOSIS — I10 HYPERTENSION, UNSPECIFIED TYPE: Primary | ICD-10-CM

## 2019-08-14 ASSESSMENT — PAIN SCALES - GENERAL: PAINLEVEL: NO PAIN (0)

## 2019-08-14 NOTE — PROGRESS NOTES
[facility]  Primary Care Center   Mariluz Abrams, DONAVON CNP  08/14/2019      Chief Complaint:   Hypertension     History of Present Illness:   Zan Ritchie is a 19 year old male with a history of diabetes mellitus type 1 who presents for evaluation of hypertension.    Hypertension: He has had high blood pressure readings multiple times in the past. He was last seen with me in clinic on 06/26/2019 and his blood pressure was measured at 144/91. He exercises regularly and diabetes is under good control. Renal US was normal. He had an echocardiogram completed 07/03/19 which was unremarkable. He has visited with cardiology in the past at [facility] and can not remember being recommended medication. Both his father and paternal grandfather have a history of hypertension but he is unsure the antihypertensive medication either have taken to treat. Mother with history of kidney disease and intermittent hypertension.     His blood pressure is still high and was measured at 146/96 today. Overall he is feeling well.     He leaves for [place] [employer] in 3 days and does not have a provider he sees there regularly.    Review of Systems:   Pertinent items are noted in HPI or as in patient entered ROS below, remainder of complete ROS is negative.     Active Medications:      Multiple Vitamins-Minerals (MULTIVITAMIN PO), Take 1 tablet by mouth daily, Disp: , Rfl:      NOVOLOG FLEXPEN 100 UNIT/ML soln, Sliding scale with food, Disp: , Rfl: 11     TRESIBA FLEXTOUCH 100 UNIT/ML pen, Inject 23 Units Subcutaneous daily, Disp: , Rfl: 11      Allergies:   Peanuts [nuts]      Past Medical History:  Diabetes mellitus type 1  Diabetic ketoacidosis      Past Surgical History:  No pertinent past surgical history     Family History:   Mother: kidney disease (one kidney)   Father: hypertension       Social History:   The patient was alone   Smoking Status: never   Smokeless Tobacco: never    Alcohol Use: yes; special occasions       Physical Exam:    BP (!) 146/96   Pulse 76   Resp 16   Wt 80.4 kg (177 lb 4.8 oz)   SpO2 100%   BMI 24.05 kg/m     BP 1: 132/86  BP 2: 136/86  BP 3: 133/86  Constitutional: Alert, oriented, pleasant, no acute distress  Head: Normocephalic, atraumatic  Eyes: Extra-ocular movements intact, pupils equally round and reactive bilaterally, no scleral icterus  ENT: Oropharynx clear, moist mucus membranes, good dentition  Cardiovascular: Regular rate and rhythm, no murmurs, rubs or gallops  Respiratory: Good air movement bilaterally, lungs clear, no wheezes/rales/rhonchi  Psychiatric: normal mentation, affect and mood      Assessment and Plan:  Hypertension, unspecified type  Continues to be just above goal. Will refer to nephrology to evaluate for secondary cause of hypertension given his young age. He is very active on a regular basis and is normal weight, with negative cardiology workup in the past. We discussed reducing sodium/processed food intake and continue with regular exercise as tolerated. He will plan to schedule with nephrology when he is home for winter break.   - NEPHROLOGY ADULT REFERRAL     Follow-up: follow up over winter break         Scribe Disclosure:  I, Alicia Georges, am serving as a scribe to document services personally performed by DONAVON Gracia CNP at this visit, based upon the provider's statements to me. All documentation has been reviewed by the aforementioned provider prior to being entered into the official medical record.     Portions of this medical record were completed by a scribe. UPON MY REVIEW AND AUTHENTICATION BY ELECTRONIC SIGNATURE, this confirms (a) I performed the applicable clinical services, and (b) the record is accurate.     DONAVON Gracia CNP

## 2019-08-14 NOTE — NURSING NOTE
Chief Complaint   Patient presents with     Hypertension     Pt reports high blood pressure     Jennifer Soriano EMT at 2:49 PM sign on 8/14/2019

## 2019-08-14 NOTE — PATIENT INSTRUCTIONS
[facility] Medication Refill Request Information:  * Please contact your pharmacy regarding ANY request for medication refills.  ** [facility] Prescription Fax = [phone]  * Please allow 3 business days for routine medication refills.  * Please allow 5 business days for controlled substance medication refills.     [facility] Test Result notification information:  *You will be notified with in 7-10 days of your appointment day regarding the results of your test.  If you are on MyChart you will be notified as soon as the provider has reviewed the results and signed off on them.    [facility]: [phone]

## 2019-08-20 NOTE — TELEPHONE ENCOUNTER
RECORDS RECEIVED FROM: Internal - Hypertension // per pt // ref by Dr. Abrams // no outside recs   DATE RECEIVED: 11.27.2019   NOTES STATUS DETAILS   OFFICE NOTE from referring provider Internal 08.14.2019   OFFICE NOTE from other specialist  N/A    *Only VASCULITIS or LUPUS gather office notes for the following N/A    *PULMONARY   N/A    *ENT N/A    *DERMATOLOGY N/A    *RHEUMATOLOGY N/A    DISCHARGE SUMMARY from hospital N/A    DISCHARGE REPORT from the ER N/A    MEDICATION LIST Internal    IMAGING  (NEED IMAGES AND REPORTS)     KIDNEY CT SCAN N/A    KIDNEY ULTRASOUND Internal 07.03.2019  06.15.2019   MR ABDOMEN N/A    NUCLEAR MEDICINE RENAL N/A    LABS     CBC Internal 06.04.2019   CMP N/A    BMP Internal 06.04.2019   UA Internal 06.26.2019   URINE PROTEIN Internal 06.26.2019   RENAL PANEL N/A    BIOPSY     KIDNEY BIOPSY  N/A

## 2019-09-26 ENCOUNTER — DOCUMENTATION ONLY (OUTPATIENT)
Dept: CARE COORDINATION | Facility: CLINIC | Age: 19
End: 2019-09-26

## 2019-11-18 DIAGNOSIS — I10 HTN (HYPERTENSION): Primary | ICD-10-CM

## 2019-11-25 ENCOUNTER — TELEPHONE (OUTPATIENT)
Dept: NEPHROLOGY | Facility: CLINIC | Age: 19
End: 2019-11-25

## 2019-11-25 NOTE — TELEPHONE ENCOUNTER
Called pt and pt's mailbox is full and I couldn't leave a message. Alice Vela/JEOVANY  November 25, 2019 9:46 AM

## 2019-11-27 ENCOUNTER — PRE VISIT (OUTPATIENT)
Dept: NEPHROLOGY | Facility: CLINIC | Age: 19
End: 2019-11-27

## 2019-12-18 DIAGNOSIS — R94.5 ABNORMAL RESULTS OF LIVER FUNCTION STUDIES: Primary | ICD-10-CM

## 2019-12-27 ENCOUNTER — TELEPHONE (OUTPATIENT)
Dept: GASTROENTEROLOGY | Facility: CLINIC | Age: 19
End: 2019-12-27

## 2019-12-27 NOTE — TELEPHONE ENCOUNTER
Patient contacted and reminded of upcoming appointment.  Patient confirmed they will be attending.  Patient instructed to bring updated medications list to appointment.    Irene Elmore on 12/27/2019 at 3:27 PM

## 2020-01-02 ENCOUNTER — OFFICE VISIT (OUTPATIENT)
Dept: GASTROENTEROLOGY | Facility: CLINIC | Age: 20
End: 2020-01-02
Attending: INTERNAL MEDICINE
Payer: COMMERCIAL

## 2020-01-02 VITALS
OXYGEN SATURATION: 98 % | SYSTOLIC BLOOD PRESSURE: 160 MMHG | DIASTOLIC BLOOD PRESSURE: 91 MMHG | WEIGHT: 187.9 LBS | BODY MASS INDEX: 25.45 KG/M2 | HEART RATE: 85 BPM | HEIGHT: 72 IN

## 2020-01-02 DIAGNOSIS — E10.9 TYPE 1 DIABETES MELLITUS WITHOUT COMPLICATION (H): ICD-10-CM

## 2020-01-02 DIAGNOSIS — R94.5 ABNORMAL RESULTS OF LIVER FUNCTION STUDIES: ICD-10-CM

## 2020-01-02 DIAGNOSIS — E11.9 DIABETES MELLITUS (H): ICD-10-CM

## 2020-01-02 LAB
ALBUMIN SERPL-MCNC: 3.6 G/DL (ref 3.4–5)
ALP SERPL-CCNC: 106 U/L (ref 40–150)
ALT SERPL W P-5'-P-CCNC: 29 U/L (ref 0–70)
ANION GAP SERPL CALCULATED.3IONS-SCNC: 3 MMOL/L (ref 3–14)
AST SERPL W P-5'-P-CCNC: 24 U/L (ref 0–45)
BILIRUB DIRECT SERPL-MCNC: 0.2 MG/DL (ref 0–0.2)
BILIRUB SERPL-MCNC: 1.1 MG/DL (ref 0.2–1.3)
BUN SERPL-MCNC: 10 MG/DL (ref 7–30)
CALCIUM SERPL-MCNC: 8.9 MG/DL (ref 8.5–10.1)
CHLORIDE SERPL-SCNC: 108 MMOL/L (ref 94–109)
CHOLEST SERPL-MCNC: 159 MG/DL
CO2 SERPL-SCNC: 30 MMOL/L (ref 20–32)
CREAT SERPL-MCNC: 1 MG/DL (ref 0.66–1.25)
CREAT UR-MCNC: 236 MG/DL
ERYTHROCYTE [DISTWIDTH] IN BLOOD BY AUTOMATED COUNT: 13.1 % (ref 10–15)
GFR SERPL CREATININE-BSD FRML MDRD: >90 ML/MIN/{1.73_M2}
GLUCOSE SERPL-MCNC: 111 MG/DL (ref 70–99)
HBA1C MFR BLD: 6.3 % (ref 0–5.6)
HCT VFR BLD AUTO: 47 % (ref 40–53)
HDLC SERPL-MCNC: 81 MG/DL
HGB BLD-MCNC: 14.7 G/DL (ref 13.3–17.7)
INR PPP: 1.07 (ref 0.86–1.14)
LDLC SERPL CALC-MCNC: 67 MG/DL
MCH RBC QN AUTO: 26.5 PG (ref 26.5–33)
MCHC RBC AUTO-ENTMCNC: 31.3 G/DL (ref 31.5–36.5)
MCV RBC AUTO: 85 FL (ref 78–100)
MICROALBUMIN UR-MCNC: 7 MG/L
MICROALBUMIN/CREAT UR: 2.9 MG/G CR (ref 0–17)
NONHDLC SERPL-MCNC: 78 MG/DL
PLATELET # BLD AUTO: 172 10E9/L (ref 150–450)
POTASSIUM SERPL-SCNC: 3.7 MMOL/L (ref 3.4–5.3)
PROT SERPL-MCNC: 6.8 G/DL (ref 6.8–8.8)
RBC # BLD AUTO: 5.55 10E12/L (ref 4.4–5.9)
SODIUM SERPL-SCNC: 142 MMOL/L (ref 133–144)
TRIGL SERPL-MCNC: 56 MG/DL
TSH SERPL DL<=0.005 MIU/L-ACNC: 1.13 MU/L (ref 0.4–4)
WBC # BLD AUTO: 6.1 10E9/L (ref 4–11)

## 2020-01-02 PROCEDURE — 36415 COLL VENOUS BLD VENIPUNCTURE: CPT | Performed by: INTERNAL MEDICINE

## 2020-01-02 PROCEDURE — 80076 HEPATIC FUNCTION PANEL: CPT | Performed by: INTERNAL MEDICINE

## 2020-01-02 PROCEDURE — 80048 BASIC METABOLIC PNL TOTAL CA: CPT | Performed by: INTERNAL MEDICINE

## 2020-01-02 PROCEDURE — 85610 PROTHROMBIN TIME: CPT | Performed by: INTERNAL MEDICINE

## 2020-01-02 PROCEDURE — G0463 HOSPITAL OUTPT CLINIC VISIT: HCPCS | Mod: ZF

## 2020-01-02 PROCEDURE — 85027 COMPLETE CBC AUTOMATED: CPT | Performed by: INTERNAL MEDICINE

## 2020-01-02 ASSESSMENT — PAIN SCALES - GENERAL: PAINLEVEL: NO PAIN (0)

## 2020-01-02 ASSESSMENT — MIFFLIN-ST. JEOR: SCORE: 1900.31

## 2020-01-02 NOTE — LETTER
1/2/2020      RE: Zan Ritchie Jr.  [id number] [facility]  [address]  [facility] [address]       Date of Service: 1/2/2020    Subjective:            Zan Ritchie is a 19 year old male presenting for follow up of abnormal liver tests    History of Present Illness   Zan Ritchie is a 19 year old male with past medical history of diabetes mellitus type 1 on insulin therapy who presented in June 2019 for abnormal liver tests that were self-limited.     He is in his 3rd year of college, is double majoring in Marketing and Psychology.  He is physically active - working our or participating in sports several times per week.  Based on the issues he has had with swelling of his lower extremities he has made radical changes in his diet to minimize sodium intake.  He has noted the onset of intermittent unilateral hand swelling several times over the past semester.  Notes the swelling will last for days and no precipitant.      Past Medical History:  Diabetes Mellitus, Type I insulin dependent    Surgical History:  No past surgical history on file.    Social History:  Social History     Tobacco Use     Smoking status: Never Smoker     Smokeless tobacco: Never Used   Substance Use Topics     Alcohol use: Yes     Comment: special occasions     Drug use: Never        Family History:  father has hypertension and type 2 diabetes and that his mother's otherwise healthy.  There is a remote family history of sickle cell disease.  - No family history of liver disease    Medications:  Current Outpatient Medications   Medication     Multiple Vitamins-Minerals (MULTIVITAMIN PO)     NOVOLOG FLEXPEN 100 UNIT/ML soln     TRESIBA FLEXTOUCH 100 UNIT/ML pen     No current facility-administered medications for this visit.        Review of Systems  A complete 10 point review of systems was asked and answered in the negative unless specifically commented upon in the HPI    Objective:         Vitals:    01/02/20 1025   BP: (!) 160/91   Pulse: 85    SpO2: 98%   Weight: 85.2 kg (187 lb 14.4 oz)   Height: 1.829 m (6')     Body mass index is 25.48 kg/m .     Physical Exam    Vitals reviewed.   Constitutional: Well-developed, well-nourished, in no apparent distress.    HEENT: Normocephalic. no scleral icterus. Moist oral mucosa. Dentition normal  Neck/Lymph: Normal ROM, supple.   Cardiac:  Regular rate and rhythm  Respiratory: normal respiratory excursion  GI:  Abdomen soft, non-distended, non-tender. BS present.   Skin:  Skin is warm and dry.   Peripheral Vascular: swelling of b/l feet, no swelling in hand  Musculoskeletal:  ROM intact, normal muscle bulk    Psychiatric: Normal mood and affect. Behavior is normal.  Neuro:  no asterixis, no tremor    Labs and Diagnostic tests:  Results for ALFREDO ROBERTSON JRJuan Daniel (MRN [id number]) as of 1/5/2020 09:53   Ref. Range 7/8/2019 09:21 1/2/2020 09:07   AST Latest Ref Range: 0 - 45 U/L 13 24   ALT Latest Ref Range: 0 - 70 U/L 22 29   Alkaline Phosphatase Latest Ref Range: 40 - 150 U/L 104 106   Bilirubin Total Latest Ref Range: 0.2 - 1.3 mg/dL 1.1 1.1       Assessment and Plan:    Abnormal Liver Tests:    - They were spurious and have normalized and remained normal  - no further follow up with hepatology noted    Hypertension:  - He was still hypertensive in clinic today  - He has established primary care at the  and has been referred to a nephrologist for hypertensive cares.  - Concern that he has made significant lifestyle modifications, still has HTN, but more concerning has symptoms of extremity swelling, headaches, and dizziness    - I will forward concerns to his care team    Routine Health Care in Patient with Chronic Liver Disease:  - We recommend screening for hepatitis A and B, please vaccinate if not immune    Follow Up:  As needed     Thank you very much for the opportunity to participate in the care of this patient.  If you have any further questions, please don't hesitate to contact our office.    Ace NOVOA  Leventhal, M.D.   of Medicine  Advanced & Transplant Hepatology  [facility]      Thomas M. Leventhal, MD

## 2020-01-05 PROBLEM — E10.9 TYPE 1 DIABETES MELLITUS (H): Status: ACTIVE | Noted: 2020-01-05

## 2020-01-05 NOTE — PROGRESS NOTES
Date of Service: 1/2/2020    Subjective:            Zan Ritchie is a 19 year old male presenting for follow up of abnormal liver tests    History of Present Illness   Zan Ritchie is a 19 year old male with past medical history of diabetes mellitus type 1 on insulin therapy who presented in June 2019 for abnormal liver tests that were self-limited.     He is in his 3rd year of college, is double majoring in Marketing and Psychology.  He is physically active - working our or participating in sports several times per week.  Based on the issues he has had with swelling of his lower extremities he has made radical changes in his diet to minimize sodium intake.  He has noted the onset of intermittent unilateral hand swelling several times over the past semester.  Notes the swelling will last for days and no precipitant.      Past Medical History:  Diabetes Mellitus, Type I insulin dependent    Surgical History:  No past surgical history on file.    Social History:  Social History     Tobacco Use     Smoking status: Never Smoker     Smokeless tobacco: Never Used   Substance Use Topics     Alcohol use: Yes     Comment: special occasions     Drug use: Never        Family History:  father has hypertension and type 2 diabetes and that his mother's otherwise healthy.  There is a remote family history of sickle cell disease.  - No family history of liver disease    Medications:  Current Outpatient Medications   Medication     Multiple Vitamins-Minerals (MULTIVITAMIN PO)     NOVOLOG FLEXPEN 100 UNIT/ML soln     TRESIBA FLEXTOUCH 100 UNIT/ML pen     No current facility-administered medications for this visit.        Review of Systems  A complete 10 point review of systems was asked and answered in the negative unless specifically commented upon in the HPI    Objective:         Vitals:    01/02/20 1025   BP: (!) 160/91   Pulse: 85   SpO2: 98%   Weight: 85.2 kg (187 lb 14.4 oz)   Height: 1.829 m (6')     Body mass index is  25.48 kg/m .     Physical Exam    Vitals reviewed.   Constitutional: Well-developed, well-nourished, in no apparent distress.    HEENT: Normocephalic. no scleral icterus. Moist oral mucosa. Dentition normal  Neck/Lymph: Normal ROM, supple.   Cardiac:  Regular rate and rhythm  Respiratory: normal respiratory excursion  GI:  Abdomen soft, non-distended, non-tender. BS present.   Skin:  Skin is warm and dry.   Peripheral Vascular: swelling of b/l feet, no swelling in hand  Musculoskeletal:  ROM intact, normal muscle bulk    Psychiatric: Normal mood and affect. Behavior is normal.  Neuro:  no asterixis, no tremor    Labs and Diagnostic tests:  Results for ALFREDO ROBERTSON JR. (MRN [id number]) as of 1/5/2020 09:53   Ref. Range 7/8/2019 09:21 1/2/2020 09:07   AST Latest Ref Range: 0 - 45 U/L 13 24   ALT Latest Ref Range: 0 - 70 U/L 22 29   Alkaline Phosphatase Latest Ref Range: 40 - 150 U/L 104 106   Bilirubin Total Latest Ref Range: 0.2 - 1.3 mg/dL 1.1 1.1       Assessment and Plan:    Abnormal Liver Tests:    - They were spurious and have normalized and remained normal  - no further follow up with hepatology noted    Hypertension:  - He was still hypertensive in clinic today  - He has established primary care at the  and has been referred to a nephrologist for hypertensive cares.  - Concern that he has made significant lifestyle modifications, still has HTN, but more concerning has symptoms of extremity swelling, headaches, and dizziness    - I will forward concerns to his care team    Routine Health Care in Patient with Chronic Liver Disease:  - We recommend screening for hepatitis A and B, please vaccinate if not immune    Follow Up:  As needed     Thank you very much for the opportunity to participate in the care of this patient.  If you have any further questions, please don't hesitate to contact our office.    Thomas M. Leventhal, M.D.   of Medicine  Advanced & Transplant Hepatology  [facility]

## 2020-01-06 ENCOUNTER — TELEPHONE (OUTPATIENT)
Dept: INTERNAL MEDICINE | Facility: CLINIC | Age: 20
End: 2020-01-06

## 2020-01-06 NOTE — TELEPHONE ENCOUNTER
Dr. Leventhal updated primary care that patient continues to have HTN (160/91 on 1/02/20) and also had extremity swelling, headaches, and dizziness at recent visit.     I called Rolando to follow up and offer a clinic appointment. Rolando has already left for school, so he is no longer in state. We discussed getting evaluated in [place]. Rolando reported he does plan on finding a physician in [place], and he will get evaluated.    Sarah Lopez) JERALD Alfonso

## 2021-07-27 ENCOUNTER — RECORDS - HEALTHEAST (OUTPATIENT)
Dept: ADMINISTRATIVE | Facility: CLINIC | Age: 21
End: 2021-07-27

## 2021-07-27 VITALS — HEIGHT: 72 IN | BODY MASS INDEX: 22.77 KG/M2 | WEIGHT: 168.1 LBS

## 2021-07-27 NOTE — PROGRESS NOTES
Subjective:       History was provided by the patient and mother.    Zan Ritchie is a 17 y.o. male who is here for this well-child visit.  He is establishing care also.  He has a history of diabetes mellitus type 1 and [facility]  He has been on insulin and his last A1c was about 7.1.  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  He will be going back to school in [facility] [employer] [place].  He does not really have any major concern today but needed to have a refill of his medications as well as a primary care physician.    Immunization History   Administered Date(s) Administered     DTaP, historic 1999, 1999, 1999, 05/10/2000, 08/12/2004     HPV Quadrivalent 09/06/2012, 07/09/2013, 10/23/2015     Hep A, historic 10/23/2015     Hep B, historic 1999, 1999, 02/07/2000     Hepatitis A, Ped/adol 2 Dose 08/07/2017     HiB, historic 1999, 1999, 1999, 05/10/2000     IPV 1999, 1999, 02/07/2000, 08/12/2004     MMR 02/07/2000, 12/02/2003     Meningococcal Conjugate 08/13/2010, 10/23/2015     RSV-MAB, pallivi 1999, 1999     Tdap 08/13/2010     Varicella 02/07/2000, 08/21/2007     Past Medical History:   Diagnosis Date     Diabetes mellitus      History reviewed. No pertinent surgical history.  Social History     Social History     Marital status: Single     Spouse name: N/A     Number of children: N/A     Years of education: N/A     Social History Main Topics     Smoking status: Never Smoker     Smokeless tobacco: Never Used     Alcohol use No     Drug use: No     Sexual activity: No     Other Topics Concern     None     Social History Narrative     None     Family History   Problem Relation Age of Onset     Diabetes Father      Hypertension Father      Allergies   Allergen Reactions     Other Food Allergy      Peanuts     Current Outpatient Prescriptions   Medication Sig Dispense Refill     BD  "ULTRA-FINE PAYAL PEN NEEDLES 32 gauge x 5/32\" Ndle Use 6-7 needles daily for insulin injection. 200 each 6     insulin glargine (BASAGLAR KWIKPEN) 100 unit/mL (3 mL) pen Inject 20 Units under the skin at bedtime. 5 adj dose pen 3     NOVOLOG PENFILL 100 unit/mL Crtg Inject 65 Units under the skin 5 x daily PRN. 5 Cartridge 6     ONETOUCH VERIO strips Use 1 each As Directed 6 (six) times a day. Dispense brand per patient's insurance at pharmacy discretion. 200 strip 6     No current facility-administered medications for this visit.          Current Issues:  Current concerns include none but as in HPI..  Currently menstruating? not applicable  Sexually active? no   Does patient snore? yes -mildly.    Review of Nutrition:  Current diet: Diabetic  Balanced diet? yes    Social Screening:   Parental relations: Normal and cordial  Discipline concerns? no  Concerns regarding behavior with peers? no  School performance: doing well; no concerns  Secondhand smoke exposure? no    Screening Questions:  Risk factors for anemia: no  Risk factors for vision problems: no  Risk factors for hearing problems: no  Risk factors for tuberculosis: no  Risk factors for dyslipidemia: yes -diabetes.  Risk factors for sexually-transmitted infections: no  Risk factors for alcohol/drug use:  no      Objective:        Vitals:    08/07/17 1628   BP: 128/80   Pulse: 76   Weight: 168 lb 1.6 oz (76.2 kg)   Height: 5' 11.75\" (1.822 m)     Growth parameters are noted and are appropriate for age.    General:   alert, appears stated age and cooperative   Gait:   normal   Skin:   normal   Oral cavity:   lips, mucosa, and tongue normal; teeth and gums normal   Eyes:   sclerae white, pupils equal and reactive   Ears:   normal bilaterally   Neck:   no adenopathy, supple, symmetrical, trachea midline and thyroid not enlarged, symmetric, no tenderness/mass/nodules   Lungs:  clear to auscultation bilaterally   Heart:   regular rate and rhythm, S1, S2 normal, no " "murmur, click, rub or gallop   Abdomen:  soft, non-tender; bowel sounds normal; no masses,  no organomegaly   :  exam deferred   Padilla Stage:   4   Extremities:  extremities normal, atraumatic, no cyanosis or edema   Neuro:  normal without focal findings, mental status, speech normal, alert and oriented x3, AJIT and reflexes normal and symmetric        Assessment:   1. Well adolescent visit  - Hepatitis A vaccine pediatric / adolescent 2 dose IM    2. Type 1 diabetes mellitus without complication  - BD ULTRA-FINE PAYAL PEN NEEDLES 32 gauge x 5/32\" Ndle; Use 6-7 needles daily for insulin injection.  Dispense: 200 each; Refill: 6  - insulin glargine (BASAGLAR KWIKPEN) 100 unit/mL (3 mL) pen; Inject 20 Units under the skin at bedtime.  Dispense: 5 adj dose pen; Refill: 3  - NOVOLOG PENFILL 100 unit/mL Crtg; Inject 65 Units under the skin 5 x daily PRN.  Dispense: 5 Cartridge; Refill: 6  - ONETOUCH VERIO strips; Use 1 each As Directed 6 (six) times a day. Dispense brand per patient's insurance at pharmacy discretion.  Dispense: 200 strip; Refill: 6  - Glycosylated Hemoglobin A1C  - LDL Cholesterol, Direct  - Basic Metabolic Panel  - Microalbumin, Random Urine     Plan:      1. Anticipatory guidance discussed.  Specific topics reviewed: bicycle helmets, drugs, ETOH, and tobacco, importance of regular dental care, importance of regular exercise, importance of varied diet, sex; STD and pregnancy prevention and testicular self-exam.    2.  Weight management:  The patient was counseled regarding nutrition and physical activity.  Medications for diabetes were refilled.  I did encourage him to find a physician in [facility] to be able to maintain his medication refills.  We will be able to refill that for him hopefully given time he will find a physician there.    3. Development: appropriate for age    4. Immunizations today: per orders.  History of previous adverse reactions to immunizations? no    5. Follow-up " visit in 1 year for next well child visit, or sooner as needed.